# Patient Record
Sex: FEMALE | Race: BLACK OR AFRICAN AMERICAN | URBAN - METROPOLITAN AREA
[De-identification: names, ages, dates, MRNs, and addresses within clinical notes are randomized per-mention and may not be internally consistent; named-entity substitution may affect disease eponyms.]

---

## 2019-03-01 ENCOUNTER — HOSPITAL ENCOUNTER (OUTPATIENT)
Dept: LAB | Age: 24
Discharge: HOME | End: 2019-03-01
Payer: SELF-PAY

## 2019-03-01 ENCOUNTER — OFFICE VISIT (OUTPATIENT)
Dept: FAMILY MEDICINE CLINIC | Age: 24
End: 2019-03-01

## 2019-03-01 VITALS
DIASTOLIC BLOOD PRESSURE: 79 MMHG | SYSTOLIC BLOOD PRESSURE: 130 MMHG | WEIGHT: 199 LBS | BODY MASS INDEX: 33.15 KG/M2 | OXYGEN SATURATION: 100 % | TEMPERATURE: 98 F | RESPIRATION RATE: 18 BRPM | HEIGHT: 65 IN | HEART RATE: 80 BPM

## 2019-03-01 DIAGNOSIS — Z30.09 FAMILY PLANNING SERVICES: ICD-10-CM

## 2019-03-01 DIAGNOSIS — R53.82 CHRONIC FATIGUE: Primary | ICD-10-CM

## 2019-03-01 DIAGNOSIS — G47.19 EXCESSIVE DAYTIME SLEEPINESS: ICD-10-CM

## 2019-03-01 DIAGNOSIS — R53.82 CHRONIC FATIGUE: ICD-10-CM

## 2019-03-01 DIAGNOSIS — R06.83 SNORING: ICD-10-CM

## 2019-03-01 LAB
ALBUMIN SERPL-MCNC: 4.4 G/DL (ref 3.4–5)
ALBUMIN/GLOB SERPL: 1.3 {RATIO} (ref 0.8–1.7)
ALP SERPL-CCNC: 73 U/L (ref 45–117)
ALT SERPL-CCNC: 42 U/L (ref 13–56)
ANION GAP SERPL CALC-SCNC: 8 MMOL/L (ref 3–18)
AST SERPL-CCNC: 22 U/L (ref 15–37)
BASOPHILS # BLD: 0 K/UL (ref 0–0.1)
BASOPHILS NFR BLD: 0 % (ref 0–2)
BILIRUB SERPL-MCNC: 0.6 MG/DL (ref 0.2–1)
BILIRUB UR QL STRIP: NEGATIVE
BUN SERPL-MCNC: 17 MG/DL (ref 7–18)
BUN/CREAT SERPL: 18 (ref 12–20)
CALCIUM SERPL-MCNC: 9.5 MG/DL (ref 8.5–10.1)
CHLORIDE SERPL-SCNC: 106 MMOL/L (ref 100–108)
CO2 SERPL-SCNC: 28 MMOL/L (ref 21–32)
CREAT SERPL-MCNC: 0.93 MG/DL (ref 0.6–1.3)
DIFFERENTIAL METHOD BLD: NORMAL
EOSINOPHIL # BLD: 0 K/UL (ref 0–0.4)
EOSINOPHIL NFR BLD: 0 % (ref 0–5)
ERYTHROCYTE [DISTWIDTH] IN BLOOD BY AUTOMATED COUNT: 14.2 % (ref 11.6–14.5)
GLOBULIN SER CALC-MCNC: 3.5 G/DL (ref 2–4)
GLUCOSE SERPL-MCNC: 79 MG/DL (ref 74–99)
GLUCOSE UR-MCNC: NEGATIVE MG/DL
HBA1C MFR BLD: 5 % (ref 4.2–5.6)
HCT VFR BLD AUTO: 39.8 % (ref 35–45)
HGB BLD-MCNC: 13.8 G/DL (ref 12–16)
KETONES P FAST UR STRIP-MCNC: NEGATIVE MG/DL
LYMPHOCYTES # BLD: 2.9 K/UL (ref 0.9–3.6)
LYMPHOCYTES NFR BLD: 31 % (ref 21–52)
MCH RBC QN AUTO: 27.4 PG (ref 24–34)
MCHC RBC AUTO-ENTMCNC: 34.7 G/DL (ref 31–37)
MCV RBC AUTO: 79 FL (ref 74–97)
MONOCYTES # BLD: 0.6 K/UL (ref 0.05–1.2)
MONOCYTES NFR BLD: 7 % (ref 3–10)
NEUTS SEG # BLD: 5.8 K/UL (ref 1.8–8)
NEUTS SEG NFR BLD: 62 % (ref 40–73)
PH UR STRIP: 6 [PH] (ref 4.6–8)
PLATELET # BLD AUTO: 307 K/UL (ref 135–420)
PMV BLD AUTO: 11.8 FL (ref 9.2–11.8)
POTASSIUM SERPL-SCNC: 3.9 MMOL/L (ref 3.5–5.5)
PROT SERPL-MCNC: 7.9 G/DL (ref 6.4–8.2)
PROT UR QL STRIP: NEGATIVE
RBC # BLD AUTO: 5.04 M/UL (ref 4.2–5.3)
SODIUM SERPL-SCNC: 142 MMOL/L (ref 136–145)
SP GR UR STRIP: 1.02 (ref 1–1.03)
T4 FREE SERPL-MCNC: 1 NG/DL (ref 0.7–1.5)
TSH SERPL DL<=0.05 MIU/L-ACNC: 0.32 UIU/ML (ref 0.36–3.74)
UA UROBILINOGEN AMB POC: NORMAL (ref 0.2–1)
URINALYSIS CLARITY POC: CLEAR
URINALYSIS COLOR POC: YELLOW
URINE BLOOD POC: NORMAL
URINE LEUKOCYTES POC: NEGATIVE
URINE NITRITES POC: NEGATIVE
WBC # BLD AUTO: 9.4 K/UL (ref 4.6–13.2)

## 2019-03-01 PROCEDURE — 82306 VITAMIN D 25 HYDROXY: CPT

## 2019-03-01 PROCEDURE — 85025 COMPLETE CBC W/AUTO DIFF WBC: CPT

## 2019-03-01 PROCEDURE — 83036 HEMOGLOBIN GLYCOSYLATED A1C: CPT

## 2019-03-01 PROCEDURE — 80053 COMPREHEN METABOLIC PANEL: CPT

## 2019-03-01 PROCEDURE — 84439 ASSAY OF FREE THYROXINE: CPT

## 2019-03-01 PROCEDURE — 82043 UR ALBUMIN QUANTITATIVE: CPT

## 2019-03-01 NOTE — PROGRESS NOTES
HISTORY OF PRESENT ILLNESS  Maegan Cook is a 21 y.o. female. HPI  Ms. Yoan Cook presents as a new patient for concerns about kidney failure and fatigue. She reports a fam hx of kidney failure and ESRD in both her parents (early 42's at dx). She reports HTN is the reason for their kidney failure. Sister of age 16 with HTN and kidney problems - being seen at [facility]. No personal hx of HTN for patient. She c/o frequent headaches and poor sleeping. She has a hx of snoring and admits this has gotten heavier. She falls asleep easily during the day and is chronically fatigued. Fam hx of mother with sleep apnea. History reviewed. No pertinent past medical history. History reviewed. No pertinent surgical history. Family History   Problem Relation Age of Onset    Kidney Disease Mother     Hypertension Mother     Kidney Disease Father     Hypertension Father     Cancer Paternal Grandfather     Hypertension Sister     Kidney Disease Sister      Social History     Tobacco Use    Smoking status: Never Smoker    Smokeless tobacco: Never Used   Substance Use Topics    Alcohol use: Yes    Drug use: No         Review of Systems   Constitutional: Positive for malaise/fatigue. Respiratory: Negative for cough and shortness of breath. Cardiovascular: Negative for chest pain and leg swelling. Neurological: Positive for headaches. Negative for dizziness. Psychiatric/Behavioral: The patient does not have insomnia. Visit Vitals  /79 (BP 1 Location: Left arm, BP Patient Position: Sitting)   Pulse 80   Temp 98 °F (36.7 °C) (Oral)   Resp 18   Ht 5' 5\" (1.651 m)   Wt 199 lb (90.3 kg)   LMP  (LMP Unknown) - on Nexplanon   SpO2 100%   BMI 33.12 kg/m²   recheck 132/70 L manual    Physical Exam   Constitutional: She is oriented to person, place, and time. She appears well-developed and well-nourished. No distress. HENT:   Head: Normocephalic and atraumatic.    Right Ear: Tympanic membrane, external ear and ear canal normal.   Left Ear: Tympanic membrane, external ear and ear canal normal.   Nose: Nose normal.   Mouth/Throat: Uvula is midline, oropharynx is clear and moist and mucous membranes are normal. No oropharyngeal exudate, posterior oropharyngeal edema, posterior oropharyngeal erythema or tonsillar abscesses. Eyes: Conjunctivae are normal. Pupils are equal, round, and reactive to light. No scleral icterus. Neck: Neck supple. Cardiovascular: Normal rate, regular rhythm and normal heart sounds. Exam reveals no gallop. No murmur heard. Pulses:       Dorsalis pedis pulses are 2+ on the right side, and 2+ on the left side. Posterior tibial pulses are 2+ on the right side, and 2+ on the left side. No pedal edema. Pulmonary/Chest: Effort normal and breath sounds normal. No respiratory distress. She has no decreased breath sounds. She has no wheezes. She has no rhonchi. She has no rales. Lymphadenopathy:        Head (right side): No submandibular and no tonsillar adenopathy present. Head (left side): No submandibular and no tonsillar adenopathy present. She has no cervical adenopathy. Right: No supraclavicular adenopathy present. Left: No supraclavicular adenopathy present. Neurological: She is alert and oriented to person, place, and time. Skin: Skin is warm and dry. Psychiatric: She has a normal mood and affect.  Her speech is normal.     Results for orders placed or performed in visit on 03/01/19   AMB POC URINALYSIS DIP STICK AUTO W/O MICRO   Result Value Ref Range    Color (UA POC) Yellow     Clarity (UA POC) Clear     Glucose (UA POC) Negative Negative    Bilirubin (UA POC) Negative Negative    Ketones (UA POC) Negative Negative    Specific gravity (UA POC) 1.025 1.001 - 1.035    Blood (UA POC) Trace Negative    pH (UA POC) 6.0 4.6 - 8.0    Protein (UA POC) Negative Negative    Urobilinogen (UA POC) 1 mg/dL 0.2 - 1    Nitrites (UA POC) Negative Negative    Leukocyte esterase (UA POC) Negative Negative       ASSESSMENT and PLAN  Diagnoses and all orders for this visit:    1. Chronic fatigue  -     REFERRAL TO SLEEP STUDIES  -     CBC WITH AUTOMATED DIFF; Future  -     METABOLIC PANEL, COMPREHENSIVE; Future  -     HEMOGLOBIN A1C W/O EAG; Future  -     MICROALBUMIN, UR, RAND W/ MICROALB/CREAT RATIO; Future  -     TSH AND FREE T4; Future  -     VITAMIN D, 25 HYDROXY; Future    2. Excessive daytime sleepiness  -     REFERRAL TO SLEEP STUDIES    3. Snoring  -     REFERRAL TO SLEEP STUDIES    4. Family planning services  -     REFERRAL TO OBSTETRICS AND GYNECOLOGY   - She c/o problems with her Nexplanon. Her OBGYN is back home. She would like to consult with someone locally. Referral placed. Follow-up Disposition:  Return for based on results.

## 2019-03-01 NOTE — PROGRESS NOTES
Rm;1    Chief Complaint   Patient presents with    Fatigue     pt presents to office with c/o feeling fatigue for some months now     Depression Screening:  3 most recent PHQ Screens 3/1/2019   Little interest or pleasure in doing things Not at all   Feeling down, depressed, irritable, or hopeless Not at all   Total Score PHQ 2 0       Learning Assessment:  Learning Assessment 3/1/2019   PRIMARY LEARNER Patient   HIGHEST LEVEL OF EDUCATION - PRIMARY LEARNER  SOME COLLEGE   PRIMARY LANGUAGE ENGLISH   LEARNER PREFERENCE PRIMARY READING   ANSWERED BY patient   RELATIONSHIP SELF       Abuse Screening:  No flowsheet data found. Health Maintenance reviewed and discussed per provider: yes     Coordination of Care:    1. Have you been to the ER, urgent care clinic since your last visit? Hospitalized since your last visit? no    2. Have you seen or consulted any other health care providers outside of the [address] [place] [address] since your last visit? Include any pap smears or colon screening.  no

## 2019-03-02 LAB
25(OH)D3 SERPL-MCNC: 13.6 NG/ML (ref 30–100)
CREAT UR-MCNC: 276 MG/DL (ref 30–125)
MICROALBUMIN UR-MCNC: 1.28 MG/DL (ref 0–3)
MICROALBUMIN/CREAT UR-RTO: 5 MG/G (ref 0–30)

## 2019-03-04 ENCOUNTER — PATIENT MESSAGE (OUTPATIENT)
Dept: FAMILY MEDICINE CLINIC | Age: 24
End: 2019-03-04

## 2019-03-08 RX ORDER — ERGOCALCIFEROL 1.25 MG/1
50000 CAPSULE ORAL
Qty: 4 CAP | Refills: 5 | Status: SHIPPED | OUTPATIENT
Start: 2019-03-08

## 2019-03-08 NOTE — TELEPHONE ENCOUNTER
From: Arlyn Friday  Sent: 3/6/2019 9:26 PM EST  To: DAMIAN Valencia  Subject: RE:Lab results    ----- Message from [address] [place] [address] [id number], [facility] sent at 3/6/2019 9:26 PM EST -----    Im going to take you up on that prescription!  ----- Message -----  From: DAMIAN Valencia  Sent: 3/6/2019 5:27 PM EST  To: Arlyn Friday  Subject: RE:Lab results  It may take several weeks to a few months for your vitamin d to become therapeutic. It would become therapeutic quicker with the prescription Vit D. Let me know if you change your mind about prescription vs over-the-counter. Thank you,  Leonardo Puentes       ----- Message -----   From: Arlyn Friday   Sent: 3/4/2019 6:52 PM EST   To: DAMIAN Valencia  Subject: RE:Lab results    Dr. Raúl Lamar,  Thank you so much for taking the time to see me and also comment on my lab results. Instead of writing the prescription I will look for them at my local grocery store. How long do you think it will take for the Vitamin D to start working in my system? Only asking because my fatigue, I feel, is really slowing me down and I want to give it adequate time before I schedule a follow up if necessary. As far as my sister, both of my parents have to submit bloodwork soon so once I get everyones results I will message you with that information! Lastly, do you still advise me to get the sleep study done and do I make that appointment or is that something thats done on your end? Thank you,  Elan WELSH  ----- Message -----  From: DAMIAN Valencia  Sent: 3/4/2019 7:33 AM EST  To: Arlyn Friday  Subject: Lab results  Ms. Reshma Cantu,  I have received your lab results and released them to you. Your kidney function via blood work was normal, and I looked for elevated microalbumin in your urine which I did not see. This is very good. I see no evidence of kidney disease based on urine and blood studies. If you can gain more information about your sister's diagnosis, that may help me better know how to monitor you.      Your Vitamin D level was low. You can start over-the-counter Vitamin D 2000 units daily or I can send a prescription once-weekly vitamin d to your pharmacy. Let me know your preference. Your labs otherwise looked good/acceptable.      Thank you,  Leonor Almaguer

## 2019-04-30 ENCOUNTER — HOSPITAL ENCOUNTER (OUTPATIENT)
Dept: LAB | Age: 24
Discharge: HOME | End: 2019-04-30
Payer: MEDICAID

## 2019-04-30 PROCEDURE — 88175 CYTOPATH C/V AUTO FLUID REDO: CPT
